# Patient Record
Sex: FEMALE | Race: BLACK OR AFRICAN AMERICAN | Employment: UNEMPLOYED | URBAN - METROPOLITAN AREA
[De-identification: names, ages, dates, MRNs, and addresses within clinical notes are randomized per-mention and may not be internally consistent; named-entity substitution may affect disease eponyms.]

---

## 2017-03-29 ENCOUNTER — TELEPHONE (OUTPATIENT)
Dept: OBGYN | Facility: CLINIC | Age: 27
End: 2017-03-29

## 2017-03-29 ENCOUNTER — TELEPHONE (OUTPATIENT)
Dept: NURSING | Facility: CLINIC | Age: 27
End: 2017-03-29

## 2017-03-29 NOTE — TELEPHONE ENCOUNTER
"Glo is requesting to transfer care to [facility] from her previous clinic [facility] in [place], [place]. Pt states she has been compliant with all prenatal appointments. Per [facility] protocol, all transfer prenatal patients that are equal to or greater than 24 weeks need prior approval from [facility] before scheduling any prenatal appointments.      3 Para 1 (2nd pregnancy was tubal pregnancy)    LMP  EDC 17    U/S done? Yes, last US     Previous C-sec? No    List all major health problems: Prior to pregnancy pt was diagnosed with a \"cyst or pouch\" that was collecting urine on the outside of her \"tube\" causing re-occurring yeast infections and pain. Surgery was scheduled for early Dec, but found out she is pregnant late Nov so surgery delayed until postpartum.     List all complications of past deliveries: ( Ask specifically about Gestational Diabetes, HTN and preeclampsia) Her 8 year old was induced at 6 months of pregnancy d/t pre-eclampsia.     List all current pregnancy issues: (Again, ask specifically about Gestational Diabetes, HTN and preeclampsia) See major health problem section. Also states she has been in and out of hospitals r/t the pain she gets and was told at her last hospitalization that the next time she goes to the ER for pain she may need a nephrostomy tube placed d/t kidney function.    List current medication list Macrobid 100 mg daily (d/t cyst), Aspirin 81 mg daily, Tramadol 50 mg every 4-6 hours prn    Per protocol, message routed to MD on-call for direction.    Pt reports she recently moved from [place] to [place] to assist with caring for her mom. Her last prenatal appt was  with next expected appt  if still in [place]. Pt is aware that if her pain increases she is to go to the ER. Pt does not have her records with her. Pt voices plan to fill out FINESSE.    Pt was walking a lot yest and was having back pain yest, but it is the same pain she has experienced r/t " kidney issues. States she almost went in to ED, but took one of her pain meds and it subsided. Denies vaginal bleeding. Denies pain this AM.

## 2017-03-29 NOTE — TELEPHONE ENCOUNTER
She needs to be scheduled for a nurse visit and at that time bring all of her records for scanning. Then she can be set up with any MD with availability.     Thanks,  Michelle Rojas MD

## 2017-03-29 NOTE — TELEPHONE ENCOUNTER
"Call Type: Triage Call    Presenting Problem: \"I'm five and a half months pregnant.\" Patient  states she has a \"Complicated\" pregnancy and recently moved to  [place], [place] from [place], [place]. MERCEDES: 07/24/17.  Patient denies any  symptoms today. Patient was conferenced to [facility]  Ob/GYN nurse.  Triage Note:  Guideline Title: No Guideline - Advice Per Reference (Adult)  Recommended Disposition: Call Provider Immediately  Original Inclination:  Override Disposition:  Intended Action:  Physician Contacted: No  CALL PROVIDER IMMEDIATELY ?  YES  SEE ED IMMEDIATELY ? NO  ACTIVATE  ? NO  Physician Instructions:  Care Advice:  "

## 2017-03-29 NOTE — TELEPHONE ENCOUNTER
"Unsure if pt will be able to get records in time, but scheduled \"nurse only\" appt for 03/30 as next availability isn't until May. If pt unable to get records to bring to appt she will need to re-schedule.     Called pt, vm left for pt to call clinic back  "

## 2017-04-20 ENCOUNTER — APPOINTMENT (OUTPATIENT)
Dept: ULTRASOUND IMAGING | Facility: CLINIC | Age: 27
End: 2017-04-20
Attending: OBSTETRICS & GYNECOLOGY
Payer: COMMERCIAL

## 2017-04-20 ENCOUNTER — HOSPITAL ENCOUNTER (OUTPATIENT)
Facility: CLINIC | Age: 27
Discharge: HOME | End: 2017-04-20
Attending: OBSTETRICS & GYNECOLOGY | Admitting: OBSTETRICS & GYNECOLOGY
Payer: COMMERCIAL

## 2017-04-20 ENCOUNTER — HOSPITAL ENCOUNTER (OUTPATIENT)
Facility: CLINIC | Age: 27
End: 2017-04-20
Admitting: OBSTETRICS & GYNECOLOGY
Payer: COMMERCIAL

## 2017-04-20 VITALS — SYSTOLIC BLOOD PRESSURE: 116 MMHG | DIASTOLIC BLOOD PRESSURE: 69 MMHG | TEMPERATURE: 97.9 F | RESPIRATION RATE: 20 BRPM

## 2017-04-20 LAB
ALBUMIN UR-MCNC: NEGATIVE MG/DL
AMPHETAMINES UR QL SCN: ABNORMAL
APPEARANCE UR: ABNORMAL
BACTERIA #/AREA URNS HPF: ABNORMAL /HPF
BILIRUB UR QL STRIP: NEGATIVE
CANNABINOIDS UR QL: ABNORMAL
COCAINE UR QL: ABNORMAL
COLOR UR AUTO: YELLOW
FIBRONECTIN FETAL VAG QL: NORMAL
GLUCOSE UR STRIP-MCNC: NEGATIVE MG/DL
HGB UR QL STRIP: ABNORMAL
HYALINE CASTS #/AREA URNS LPF: 2 /LPF (ref 0–2)
KETONES UR STRIP-MCNC: NEGATIVE MG/DL
LEUKOCYTE ESTERASE UR QL STRIP: ABNORMAL
MUCOUS THREADS #/AREA URNS LPF: PRESENT /LPF
NITRATE UR QL: NEGATIVE
OPIATES UR QL SCN: ABNORMAL
PCP UR QL SCN: ABNORMAL
PH UR STRIP: 7 PH (ref 5–7)
RBC #/AREA URNS AUTO: 7 /HPF (ref 0–2)
SP GR UR STRIP: 1.01 (ref 1–1.03)
SQUAMOUS #/AREA URNS AUTO: 11 /HPF (ref 0–1)
URN SPEC COLLECT METH UR: ABNORMAL
UROBILINOGEN UR STRIP-MCNC: 0 MG/DL (ref 0–2)
WBC #/AREA URNS AUTO: 90 /HPF (ref 0–2)

## 2017-04-20 PROCEDURE — 80349 CANNABINOIDS NATURAL: CPT | Performed by: OBSTETRICS & GYNECOLOGY

## 2017-04-20 PROCEDURE — 82731 ASSAY OF FETAL FIBRONECTIN: CPT | Performed by: OBSTETRICS & GYNECOLOGY

## 2017-04-20 PROCEDURE — 99214 OFFICE O/P EST MOD 30 MIN: CPT

## 2017-04-20 PROCEDURE — 76770 US EXAM ABDO BACK WALL COMP: CPT

## 2017-04-20 PROCEDURE — 81001 URINALYSIS AUTO W/SCOPE: CPT | Performed by: OBSTETRICS & GYNECOLOGY

## 2017-04-20 PROCEDURE — 87086 URINE CULTURE/COLONY COUNT: CPT | Performed by: OBSTETRICS & GYNECOLOGY

## 2017-04-20 PROCEDURE — 80307 DRUG TEST PRSMV CHEM ANLYZR: CPT | Performed by: OBSTETRICS & GYNECOLOGY

## 2017-04-20 RX ORDER — PRENATAL VIT/IRON FUM/FOLIC AC 27MG-0.8MG
1 TABLET ORAL DAILY
COMMUNITY
End: 2019-10-07

## 2017-04-20 NOTE — IP AVS SNAPSHOT
[facility] Labor and Delivery    [address]    [facility] [address]    Phone:  [phone]    Fax:  [phone]                                       After Visit Summary   4/20/2017    Glo Meléndez    MRN: [id number]           After Visit Summary Signature Page     I have received my discharge instructions, and my questions have been answered. I have discussed any challenges I see with this plan with the nurse or doctor.    ..........................................................................................................................................  Patient/Patient Representative Signature      ..........................................................................................................................................  Patient Representative Print Name and Relationship to Patient    ..................................................               ................................................  Date                                            Time    ..........................................................................................................................................  Reviewed by Signature/Title    ...................................................              ..............................................  Date                                                            Time

## 2017-04-20 NOTE — IP AVS SNAPSHOT
MRN:[id number]                      After Visit Summary   4/20/2017    Glo Meléndez    MRN: [id number]           Thank you!     Thank you for choosing [facility] for your care. Our goal is always to provide you with excellent care. Hearing back from our patients is one way we can continue to improve our services. Please take a few minutes to complete the written survey that you may receive in the mail after you visit. If you would like to speak to someone directly about your visit please contact Patient Relations at [phone]. Thank you!          Patient Information     Date Of Birth          1990        About your hospital stay     You were admitted on:  April 20, 2017 You last received care in the:  [facility] Labor and Delivery    You were discharged on:  April 20, 2017       Who to Call     For medical emergencies, please call 911.  For non-urgent questions about your medical care, please call your primary care provider or clinic, None          Attending Provider     Provider Specialty    Gilberto Connelly MD OB/Gyn       Primary Care Provider    None       No address on file        Further instructions from your care team       Discharge Instruction for Undelivered Patients      You were seen for: cramping  We Consulted: Dr Connelly  You had (Test or Medicine):monitoring, renal ultrasound, negative Fetal Fibronectin     Diet:   Drink 8 to 12 glasses of liquids (milk, juice, water) every day.  You may eat meals and snacks.     Activity:  Call your doctor or nurse midwife if your baby is moving less than usual.     Call your provider if you notice:  Swelling in your face or increased swelling in your hands or legs.  Headaches that are not relieved by Tylenol (acetaminophen).  Changes in your vision (blurring: seeing spots or stars.)  Nausea (sick to your stomach) and vomiting (throwing up).   Weight gain of 5 pounds or more per week.  Heartburn that doesn't go  "away.  Signs of bladder infection: pain when you urinate (use the toilet), need to go more often and more urgently.  The bag of langley (rupture of membranes) breaks, or you notice leaking in your underwear.  Bright red blood in your underwear.  Abdominal (lower belly) or stomach pain.  For first baby: Contractions (tightening) less than 5 minutes apart for one hour or more.  Second (plus) baby: Contractions (tightening) less than 10 minutes apart and getting stronger.  *If less than 34 weeks: Contractions (tightenings) more than 6 times in one hour.  Increase or change in vaginal discharge (note the color and amount)  Other:     Follow-up:  Get in contact with [facility] clinic in [place] to schedule future appointments.          Pending Results     Date and Time Order Name Status Description    2017 1620 Drug abuse scrn 7 UR (/) (RH, SH, UR) In process     2017 1620 Urine Culture Aerobic Bacterial In process     2017 1614 US Renal Complete Preliminary             Admission Information     Date & Time Provider Department Dept. Phone    2017 Gilberto Connelly MD [facility] Labor and Delivery [phone]      Your Vitals Were     Blood Pressure Temperature Respirations             116/69 97.9  F (36.6  C) (Oral) 20         MyChart Information     [facility] lets you send messages to your doctor, view your test results, renew your prescriptions, schedule appointments and more. To sign up, go to [email or web address] . Click on \"Log in\" on the left side of the screen, which will take you to the Welcome page. Then click on \"Sign up Now\" on the right side of the page.     You will be asked to enter the access code listed below, as well as some personal information. Please follow the directions to create your username and password.     Your access code is: [email or web address]  Expires: 2017  6:17 PM     Your access code will  in 90 days. If you need help or a new " code, please call your [place] clinic or [phone].        Care EveryWhere ID     This is your Care EveryWhere ID. This could be used by other organizations to access your [place] medical records  [id number]           Review of your medicines      UNREVIEWED medicines. Ask your doctor about these medicines        Dose / Directions    ASPIRIN PO        Dose:  81 mg   Take 81 mg by mouth daily   Refills:  0       CELEXA PO        Refills:  0       NITROFURANTOIN MONOHYD MACRO PO        Dose:  100 mg   Take 100 mg by mouth daily   Refills:  0       prenatal multivitamin  plus iron 27-0.8 MG Tabs per tablet   Indication:  Pregnancy        Dose:  1 tablet   Take 1 tablet by mouth daily   Refills:  0       TRAMADOL HCL PO        Dose:  50 mg   Take 50 mg by mouth every 6 hours as needed for moderate to severe pain   Refills:  0                Protect others around you: Learn how to safely use, store and throw away your medicines at www.disposemymeds.org.             Medication List: This is a list of all your medications and when to take them. Check marks below indicate your daily home schedule. Keep this list as a reference.      Medications           Morning Afternoon Evening Bedtime As Needed    ASPIRIN PO   Take 81 mg by mouth daily                                CELEXA PO                                NITROFURANTOIN MONOHYD MACRO PO   Take 100 mg by mouth daily                                prenatal multivitamin  plus iron 27-0.8 MG Tabs per tablet   Take 1 tablet by mouth daily                                TRAMADOL HCL PO   Take 50 mg by mouth every 6 hours as needed for moderate to severe pain
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
## 2017-04-20 NOTE — DISCHARGE INSTRUCTIONS
Discharge Instruction for Undelivered Patients      You were seen for: cramping  We Consulted: Dr Connelly  You had (Test or Medicine):monitoring, renal ultrasound, negative Fetal Fibronectin     Diet:   Drink 8 to 12 glasses of liquids (milk, juice, water) every day.  You may eat meals and snacks.     Activity:  Call your doctor or nurse midwife if your baby is moving less than usual.     Call your provider if you notice:  Swelling in your face or increased swelling in your hands or legs.  Headaches that are not relieved by Tylenol (acetaminophen).  Changes in your vision (blurring: seeing spots or stars.)  Nausea (sick to your stomach) and vomiting (throwing up).   Weight gain of 5 pounds or more per week.  Heartburn that doesn't go away.  Signs of bladder infection: pain when you urinate (use the toilet), need to go more often and more urgently.  The bag of langley (rupture of membranes) breaks, or you notice leaking in your underwear.  Bright red blood in your underwear.  Abdominal (lower belly) or stomach pain.  For first baby: Contractions (tightening) less than 5 minutes apart for one hour or more.  Second (plus) baby: Contractions (tightening) less than 10 minutes apart and getting stronger.  *If less than 34 weeks: Contractions (tightenings) more than 6 times in one hour.  Increase or change in vaginal discharge (note the color and amount)  Other:     Follow-up:  Get in contact with [facility] clinic in [place] to schedule future appointments.

## 2017-04-20 NOTE — PROGRESS NOTES
OB PN    26 yo  at 26w3d presented by ambulance to L&D with pelvic pain.  Denies ctx or pelvic pressure.  No bleeding or discharge.    Patient has been receiving care in [place], [place] but presently residing in [place] and plans to remain here until delivery.  Records obtained and reviewed.    Pregnancy complicated by substance use/abuse, large urethral diverticulum, several visits for pelvic pain and right hydronephrosis.  She has a hx or pre eclampsia and PTD in [facility] at [facility].  Baby weighed 3lbs 7 oz.    She has been on a baby ASA daily and prophylactic macrobid.  Urology has been involved.  She has a prescription for Tramadol for her chronic pain.    She was relatively calm after arriving with no ctx identified.  FFN neg.  Large urethral diverticulum presents which did not allow digital cervix exam.  U/S showed bilat hydronephrosis compatible with pregnancy R>L but no stones or hydro ureter.    Patient will be discharged in stable condition with plan to follow up at [facility] next week.

## 2017-04-21 LAB
BACTERIA SPEC CULT: NORMAL
Lab: NORMAL
MICRO REPORT STATUS: NORMAL
SPECIMEN SOURCE: NORMAL

## 2017-04-24 LAB — THC UR QL CFM: NORMAL

## 2017-05-10 ENCOUNTER — HOSPITAL ENCOUNTER (OUTPATIENT)
Facility: CLINIC | Age: 27
End: 2017-05-10
Admitting: OBSTETRICS & GYNECOLOGY
Payer: COMMERCIAL

## 2017-05-10 ENCOUNTER — HOSPITAL ENCOUNTER (OUTPATIENT)
Facility: CLINIC | Age: 27
Discharge: HOME | End: 2017-05-10
Attending: OBSTETRICS & GYNECOLOGY | Admitting: OBSTETRICS & GYNECOLOGY
Payer: COMMERCIAL

## 2017-05-10 VITALS
DIASTOLIC BLOOD PRESSURE: 52 MMHG | SYSTOLIC BLOOD PRESSURE: 98 MMHG | HEIGHT: 67 IN | RESPIRATION RATE: 18 BRPM | WEIGHT: 178 LBS | TEMPERATURE: 99.4 F | BODY MASS INDEX: 27.94 KG/M2

## 2017-05-10 LAB
ABO + RH BLD: NORMAL
ABO + RH BLD: NORMAL
ALBUMIN UR-MCNC: NEGATIVE MG/DL
AMORPH CRY #/AREA URNS HPF: ABNORMAL /HPF
AMPHETAMINES UR QL SCN: NORMAL
APPEARANCE UR: ABNORMAL
BACTERIA #/AREA URNS HPF: ABNORMAL /HPF
BILIRUB UR QL STRIP: NEGATIVE
BLD GP AB SCN SERPL QL: NORMAL
BLOOD BANK CMNT PATIENT-IMP: NORMAL
CANNABINOIDS UR QL: NORMAL
COCAINE UR QL: NORMAL
COLOR UR AUTO: YELLOW
ERYTHROCYTE [DISTWIDTH] IN BLOOD BY AUTOMATED COUNT: 12.7 % (ref 10–15)
FIBRONECTIN FETAL VAG QL: NORMAL
GLUCOSE UR STRIP-MCNC: >499 MG/DL
HCT VFR BLD AUTO: 31.9 % (ref 35–47)
HGB BLD-MCNC: 11 G/DL (ref 11.7–15.7)
HGB UR QL STRIP: ABNORMAL
KETONES UR STRIP-MCNC: NEGATIVE MG/DL
LEUKOCYTE ESTERASE UR QL STRIP: ABNORMAL
MCH RBC QN AUTO: 33 PG (ref 26.5–33)
MCHC RBC AUTO-ENTMCNC: 34.5 G/DL (ref 31.5–36.5)
MCV RBC AUTO: 96 FL (ref 78–100)
MUCOUS THREADS #/AREA URNS LPF: PRESENT /LPF
NITRATE UR QL: NEGATIVE
OPIATES UR QL SCN: NORMAL
PCP UR QL SCN: NORMAL
PH UR STRIP: 7 PH (ref 5–7)
PLATELET # BLD AUTO: 234 10E9/L (ref 150–450)
RBC # BLD AUTO: 3.33 10E12/L (ref 3.8–5.2)
RBC #/AREA URNS AUTO: 11 /HPF (ref 0–2)
SP GR UR STRIP: 1.02 (ref 1–1.03)
SPECIMEN EXP DATE BLD: NORMAL
SQUAMOUS #/AREA URNS AUTO: 5 /HPF (ref 0–1)
URN SPEC COLLECT METH UR: ABNORMAL
UROBILINOGEN UR STRIP-MCNC: 0 MG/DL (ref 0–2)
WBC # BLD AUTO: 7.3 10E9/L (ref 4–11)
WBC #/AREA URNS AUTO: >182 /HPF (ref 0–2)

## 2017-05-10 PROCEDURE — 96360 HYDRATION IV INFUSION INIT: CPT

## 2017-05-10 PROCEDURE — 81001 URINALYSIS AUTO W/SCOPE: CPT | Performed by: OBSTETRICS & GYNECOLOGY

## 2017-05-10 PROCEDURE — 25800025 ZZH RX 258: Performed by: OBSTETRICS & GYNECOLOGY

## 2017-05-10 PROCEDURE — 85027 COMPLETE CBC AUTOMATED: CPT | Performed by: OBSTETRICS & GYNECOLOGY

## 2017-05-10 PROCEDURE — 80307 DRUG TEST PRSMV CHEM ANLYZR: CPT | Performed by: OBSTETRICS & GYNECOLOGY

## 2017-05-10 PROCEDURE — 87086 URINE CULTURE/COLONY COUNT: CPT | Performed by: OBSTETRICS & GYNECOLOGY

## 2017-05-10 PROCEDURE — 96375 TX/PRO/DX INJ NEW DRUG ADDON: CPT

## 2017-05-10 PROCEDURE — 86900 BLOOD TYPING SEROLOGIC ABO: CPT | Performed by: OBSTETRICS & GYNECOLOGY

## 2017-05-10 PROCEDURE — 96374 THER/PROPH/DIAG INJ IV PUSH: CPT

## 2017-05-10 PROCEDURE — 96372 THER/PROPH/DIAG INJ SC/IM: CPT | Mod: 59

## 2017-05-10 PROCEDURE — 25000125 ZZHC RX 250: Performed by: OBSTETRICS & GYNECOLOGY

## 2017-05-10 PROCEDURE — 86850 RBC ANTIBODY SCREEN: CPT | Performed by: OBSTETRICS & GYNECOLOGY

## 2017-05-10 PROCEDURE — 96361 HYDRATE IV INFUSION ADD-ON: CPT

## 2017-05-10 PROCEDURE — 40000809 ZZH STATISTIC NO DOCUMENTATION TO SUPPORT CHARGE

## 2017-05-10 PROCEDURE — 25000128 H RX IP 250 OP 636: Performed by: OBSTETRICS & GYNECOLOGY

## 2017-05-10 PROCEDURE — 82731 ASSAY OF FETAL FIBRONECTIN: CPT | Performed by: OBSTETRICS & GYNECOLOGY

## 2017-05-10 PROCEDURE — 99214 OFFICE O/P EST MOD 30 MIN: CPT | Mod: 25

## 2017-05-10 PROCEDURE — 96368 THER/DIAG CONCURRENT INF: CPT

## 2017-05-10 PROCEDURE — 86901 BLOOD TYPING SEROLOGIC RH(D): CPT | Performed by: OBSTETRICS & GYNECOLOGY

## 2017-05-10 PROCEDURE — 86780 TREPONEMA PALLIDUM: CPT | Performed by: OBSTETRICS & GYNECOLOGY

## 2017-05-10 RX ORDER — TERBUTALINE SULFATE 1 MG/ML
0.25 INJECTION, SOLUTION SUBCUTANEOUS ONCE
Status: COMPLETED | OUTPATIENT
Start: 2017-05-10 | End: 2017-05-10

## 2017-05-10 RX ORDER — ACETAMINOPHEN 10 MG/ML
1000 INJECTION, SOLUTION INTRAVENOUS ONCE
Status: COMPLETED | OUTPATIENT
Start: 2017-05-10 | End: 2017-05-10

## 2017-05-10 RX ORDER — CEFTRIAXONE 2 G/1
2 INJECTION, POWDER, FOR SOLUTION INTRAMUSCULAR; INTRAVENOUS ONCE
Status: COMPLETED | OUTPATIENT
Start: 2017-05-10 | End: 2017-05-10

## 2017-05-10 RX ORDER — SODIUM CHLORIDE 9 MG/ML
INJECTION, SOLUTION INTRAVENOUS CONTINUOUS
Status: DISCONTINUED | OUTPATIENT
Start: 2017-05-10 | End: 2017-05-10 | Stop reason: HOSPADM

## 2017-05-10 RX ORDER — DEXTROSE, SODIUM CHLORIDE, SODIUM LACTATE, POTASSIUM CHLORIDE, AND CALCIUM CHLORIDE 5; .6; .31; .03; .02 G/100ML; G/100ML; G/100ML; G/100ML; G/100ML
500 INJECTION, SOLUTION INTRAVENOUS ONCE
Status: COMPLETED | OUTPATIENT
Start: 2017-05-10 | End: 2017-05-10

## 2017-05-10 RX ORDER — DEXTROSE, SODIUM CHLORIDE, SODIUM LACTATE, POTASSIUM CHLORIDE, AND CALCIUM CHLORIDE 5; .6; .31; .03; .02 G/100ML; G/100ML; G/100ML; G/100ML; G/100ML
125 INJECTION, SOLUTION INTRAVENOUS CONTINUOUS
Status: DISCONTINUED | OUTPATIENT
Start: 2017-05-10 | End: 2017-05-10

## 2017-05-10 RX ORDER — MORPHINE SULFATE 4 MG/ML
4 INJECTION, SOLUTION INTRAMUSCULAR; INTRAVENOUS ONCE
Status: COMPLETED | OUTPATIENT
Start: 2017-05-10 | End: 2017-05-10

## 2017-05-10 RX ORDER — ONDANSETRON 2 MG/ML
4 INJECTION INTRAMUSCULAR; INTRAVENOUS EVERY 6 HOURS PRN
Status: DISCONTINUED | OUTPATIENT
Start: 2017-05-10 | End: 2017-05-10 | Stop reason: HOSPADM

## 2017-05-10 RX ADMIN — CEFTRIAXONE 2 G: 2 INJECTION, POWDER, FOR SOLUTION INTRAMUSCULAR; INTRAVENOUS at 15:43

## 2017-05-10 RX ADMIN — SODIUM CHLORIDE: 9 INJECTION, SOLUTION INTRAVENOUS at 15:43

## 2017-05-10 RX ADMIN — SODIUM CHLORIDE, SODIUM LACTATE, POTASSIUM CHLORIDE, CALCIUM CHLORIDE AND DEXTROSE MONOHYDRATE 500 ML: 5; 600; 310; 30; 20 INJECTION, SOLUTION INTRAVENOUS at 12:10

## 2017-05-10 RX ADMIN — TERBUTALINE SULFATE 0.25 MG: 1 INJECTION, SOLUTION SUBCUTANEOUS at 11:56

## 2017-05-10 RX ADMIN — ACETAMINOPHEN 1000 MG: 10 INJECTION, SOLUTION INTRAVENOUS at 12:41

## 2017-05-10 RX ADMIN — ONDANSETRON 4 MG: 2 INJECTION INTRAMUSCULAR; INTRAVENOUS at 14:51

## 2017-05-10 RX ADMIN — MORPHINE SULFATE 4 MG: 4 INJECTION, SOLUTION INTRAMUSCULAR; INTRAVENOUS at 14:12

## 2017-05-10 NOTE — DISCHARGE INSTRUCTIONS
Discharge Instruction for Undelivered Patients      You were seen for: Contractions and pain  We Consulted:   You had (Test or Medicine):monitoring, UA, IV tylenol and antibiotics     Diet:   Drink 8 to 12 glasses of liquids (milk, juice, water) every day.     Activity:  Call your doctor or nurse midwife if your baby is moving less than usual.     Call your provider if you notice:  Swelling in your face or increased swelling in your hands or legs.  Headaches that are not relieved by Tylenol (acetaminophen).  Changes in your vision (blurring: seeing spots or stars.)  Nausea (sick to your stomach) and vomiting (throwing up).   Weight gain of 5 pounds or more per week.  Heartburn that doesn't go away.  Signs of bladder infection: pain when you urinate (use the toilet), need to go more often and more urgently.  The bag of langley (rupture of membranes) breaks, or you notice leaking in your underwear.  Bright red blood in your underwear.  Abdominal (lower belly) or stomach pain.  Second (plus) baby: Contractions (tightening) less than 10 minutes apart and getting stronger.  *If less than 34 weeks: Contractions (tightenings) more than 6 times in one hour.  Increase or change in vaginal discharge (note the color and amount)      Follow-up:  As scheduled in the clinic on Monday the 15th.  Urology should be calling to follow up by the end of this week.  [facility] will call regarding urine culture.   Start taking Macrobid 1 tablet twice a day for 10 days. After that take 1 tablet once a day until delivery.

## 2017-05-10 NOTE — PROVIDER NOTIFICATION
05/10/17 1710   Provider Notification   Provider Name/Title    Method of Notification Electronic Page;Phone   Request Evaluate - Remote   Notification Reason Status Update   Pt is feeling much better then when she came in, the pain is gone to slightly mild. Pt ready to go home.  Contractions are occasional, pt not feeling.  Orders received to discharge home with follow up on Monday the 15th as already scheduled. Instruct pt to take home Macrobid twice a day for 10 days then take once a day until delivery. Inform pt Urology should be calling her by the end of the week as follow up and [facility] will call regarding the urine culture results.

## 2017-05-10 NOTE — PLAN OF CARE
Reviewed discharge instructions with patient. Verbalized understanding of Macrobid directions and follow up.  Pt ambulatory at discharge accompanied by her mother.   Discharged home undelivered.

## 2017-05-10 NOTE — PROGRESS NOTES
Pt c/o's of continued pain, screaming in triage room.  Using profanity stating we are not helping her.  IV fluids going, IV Tylenol almost in.  Refused CEFT/TOCO and threw monitors off.  States her abdominal pain is not bothering her but it's the urethral cyst because of our vaginal checks.  States when she goes in to [facility] in [place] for this pain, they give her real pain medication and that does the trick.  She is threatening to leave and call her Mom.    I explained out PTL evaluation, cervix closed FFN negative which is good as she is 29+ weeks.  Discussed the importance of checking her cervix in this setting to determine proper care.  Explained we are actually treating her pain with IV Tylenol which has been shown to be as effective as narcotics in some studies.  I relayed my concern about using narcotics with her drug hx.      We will continue to monitor at this time.    Dr. Mónica Lloyd
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
## 2017-05-10 NOTE — PLAN OF CARE
Pt came in by ambulance with severe abdominal pain which started at 1020. Describes the pain as severe sharp and cramping, especially on the left. Rates it 10 out of 10. Dr Lloyd is at the bedside. Orders received. FFN collected. SVE closed/50/-3 station. Denies any vaginal bleeding or LOF. Pt is having ctx  Every 1-3 minutes. Terbutaline 0.25 mg sub q given.PIV started in left arm and D5  ml bolus was started.

## 2017-05-10 NOTE — PROGRESS NOTES
Late entry from around 1430.  Called to room as pt is screaming in pain and on the floor.  States that she continues to have severe pain in area of urethral cyst, exacerbated by our previous exam.  IV Tylenol helped minimally.  Requesting Morphine for pain.  Given the amount of discomfort pt is in, will give 4 mg of IV Morphine and continue to monitor.  Awaiting UA and UDS.      Addendum around 1530- UA reviewed and concerning for UTI, UDS negative.  Dose of IV Rocephin ordered and will continue to monitor.    Addendum around 1700- talked with Kerri ARNODL.  Pt's pain significantly better since IV Morphine.  FHT's reassuring at 29 weeks with irritability on monitor.  Pt desires discharge.  Recommended she take BID Macrobid (as she has been taking it intermittently on a daily basis) for 10 days and that we would be calling her with UC by the end of the week and will make abx change if needed.  Urology will likely call by end of the week as well as she had recent appt with them.  Pt instructed to keep scheduled OB visit for 5/15 and return precautions reviewed.    A/P IUP 29 2/7 with UTI complicated by urethral diverticulum, no evidence of PTL- see note above.    Dr. Mónica Lloyd

## 2017-05-10 NOTE — PLAN OF CARE
Pt is refusing to have the external monitors on to monitor uterine contractions and FHR. Explained the importance of monitoring baby and the ctx. Pt is using profanity and took off the monitors off, threw them on the floor and states she doesn't care. Ofirmev medication explained to pt who agrees with plan. PIV started in left arm with 18 g cath. Labs collected. Ofirmev infusion is completed. Pt reports the ctx pain is gone, but her cyst pain has not been touched. Dr Lloyd was called to the bedside. After talking with pt she keeps saying she just wants a narcotic for the pain. She then called the hospital in [place] stating she's not getting any medication for the pain or help with the ctx.

## 2017-05-10 NOTE — PLAN OF CARE
Pt appears much more comfortable after the morphine. Rates her pain @ 4 out of 10. Dr Lloyd called with the UA results. Orders received for Rocephin 2 gm IV now.  Report given to Kerri Norton RN who is assuming cares.

## 2017-05-10 NOTE — IP AVS SNAPSHOT
MRN:[id number]                      After Visit Summary   5/10/2017    Glo Meléndez    MRN: [id number]           Thank you!     Thank you for choosing [facility] for your care. Our goal is always to provide you with excellent care. Hearing back from our patients is one way we can continue to improve our services. Please take a few minutes to complete the written survey that you may receive in the mail after you visit. If you would like to speak to someone directly about your visit please contact Patient Relations at [phone]. Thank you!          Patient Information     Date Of Birth          1990        About your hospital stay     You were admitted on:  May 10, 2017 You last received care in the:  [facility]    You were discharged on:  May 10, 2017       Who to Call     For medical emergencies, please call 911.  For non-urgent questions about your medical care, please call your primary care provider or clinic, None          Attending Provider     Provider Specialty    Mónica Lloyd, DO OB/Gyn       Primary Care Provider    None       No address on file        Further instructions from your care team       Discharge Instruction for Undelivered Patients      You were seen for: Contractions and pain  We Consulted:   You had (Test or Medicine):monitoring, UA, IV tylenol and antibiotics     Diet:   Drink 8 to 12 glasses of liquids (milk, juice, water) every day.     Activity:  Call your doctor or nurse midwife if your baby is moving less than usual.     Call your provider if you notice:  Swelling in your face or increased swelling in your hands or legs.  Headaches that are not relieved by Tylenol (acetaminophen).  Changes in your vision (blurring: seeing spots or stars.)  Nausea (sick to your stomach) and vomiting (throwing up).   Weight gain of 5 pounds or more per week.  Heartburn that doesn't go away.  Signs of bladder infection: pain  "when you urinate (use the toilet), need to go more often and more urgently.  The bag of langley (rupture of membranes) breaks, or you notice leaking in your underwear.  Bright red blood in your underwear.  Abdominal (lower belly) or stomach pain.  Second (plus) baby: Contractions (tightening) less than 10 minutes apart and getting stronger.  *If less than 34 weeks: Contractions (tightenings) more than 6 times in one hour.  Increase or change in vaginal discharge (note the color and amount)      Follow-up:  As scheduled in the clinic on Monday the .  Urology should be calling to follow up by the end of this week.  [facility] will call regarding urine culture.   Start taking Macrobid 1 tablet twice a day for 10 days. After that take 1 tablet once a day until delivery.        Pending Results     Date and Time Order Name Status Description    5/10/2017 1200 Anti Treponema In process             Admission Information     Date & Time Provider Department Dept. Phone    5/10/2017 Mónica Lloyd,  [facility] [phone]      Your Vitals Were     Blood Pressure Temperature Respirations Height Weight BMI (Body Mass Index)    98/52 99.4  F (37.4  C) (Temporal) 18 1.702 m (5' 7\") 80.7 kg (178 lb) 27.88 kg/m2      [employer] Information     [employer] lets you send messages to your doctor, view your test results, renew your prescriptions, schedule appointments and more. To sign up, go to [email or web address] . Click on \"Log in\" on the left side of the screen, which will take you to the Welcome page. Then click on \"Sign up Now\" on the right side of the page.     You will be asked to enter the access code listed below, as well as some personal information. Please follow the directions to create your username and password.     Your access code is: [email or web address]  Expires: 2017  6:17 PM     Your access code will  in 90 days. If you need help or a new code, please call your [facility] or " [phone].        Care EveryWhere ID     This is your Care EveryWhere ID. This could be used by other organizations to access your [place] medical records  [id number]           Review of your medicines      UNREVIEWED medicines. Ask your doctor about these medicines        Dose / Directions    ASPIRIN PO        Dose:  81 mg   Take 81 mg by mouth daily   Refills:  0       CELEXA PO        Refills:  0       NITROFURANTOIN MONOHYD MACRO PO        Dose:  100 mg   Take 100 mg by mouth daily   Refills:  0       prenatal multivitamin  plus iron 27-0.8 MG Tabs per tablet   Indication:  Pregnancy        Dose:  1 tablet   Take 1 tablet by mouth daily   Refills:  0       TRAMADOL HCL PO        Dose:  50 mg   Take 50 mg by mouth every 6 hours as needed for moderate to severe pain   Refills:  0                Protect others around you: Learn how to safely use, store and throw away your medicines at www.disposemymeds.org.             Medication List: This is a list of all your medications and when to take them. Check marks below indicate your daily home schedule. Keep this list as a reference.      Medications           Morning Afternoon Evening Bedtime As Needed    ASPIRIN PO   Take 81 mg by mouth daily                                CELEXA PO                                NITROFURANTOIN MONOHYD MACRO PO   Take 100 mg by mouth daily                                prenatal multivitamin  plus iron 27-0.8 MG Tabs per tablet   Take 1 tablet by mouth daily                                TRAMADOL HCL PO   Take 50 mg by mouth every 6 hours as needed for moderate to severe pain

## 2017-05-10 NOTE — PLAN OF CARE
Pt continues screaming and profanities. Dr Lloyd came to the bedside to try and discuss how to manage the pain from the cyst. Pt again threw off the monitors and climbed off the bed and went on her hands and knees on the floor. Encouraged to get back into bed. Orders received to give Morphine 4 mg IV once. Upon leaving the room to get the medication, the screaming stopped.

## 2017-05-10 NOTE — IP AVS SNAPSHOT
[facility]    [address]    [facility] [address]    Phone:  [phone]    Fax:  [phone]                                       After Visit Summary   5/10/2017    Glo Meléndez    MRN: [id number]           After Visit Summary Signature Page     I have received my discharge instructions, and my questions have been answered. I have discussed any challenges I see with this plan with the nurse or doctor.    ..........................................................................................................................................  Patient/Patient Representative Signature      ..........................................................................................................................................  Patient Representative Print Name and Relationship to Patient    ..................................................               ................................................  Date                                            Time    ..........................................................................................................................................  Reviewed by Signature/Title    ...................................................              ..............................................  Date                                                            Time

## 2017-05-11 LAB — T PALLIDUM IGG+IGM SER QL: NEGATIVE

## 2017-05-12 LAB
BACTERIA SPEC CULT: NORMAL
Lab: NORMAL
MICRO REPORT STATUS: NORMAL
SPECIMEN SOURCE: NORMAL

## 2017-05-18 ENCOUNTER — HOSPITAL ENCOUNTER (INPATIENT)
Facility: CLINIC | Age: 27
Setting detail: SURGERY ADMIT
End: 2017-05-18
Attending: OBSTETRICS & GYNECOLOGY | Admitting: OBSTETRICS & GYNECOLOGY
Payer: COMMERCIAL

## 2017-07-10 RX ORDER — CEFAZOLIN SODIUM 2 G/100ML
2 INJECTION, SOLUTION INTRAVENOUS
Status: CANCELLED | OUTPATIENT
Start: 2017-07-10

## 2017-07-10 RX ORDER — CEFAZOLIN SODIUM 1 G/3ML
1 INJECTION, POWDER, FOR SOLUTION INTRAMUSCULAR; INTRAVENOUS
Status: CANCELLED | OUTPATIENT
Start: 2017-07-10

## 2017-07-10 RX ORDER — CITRIC ACID/SODIUM CITRATE 334-500MG
30 SOLUTION, ORAL ORAL
Status: CANCELLED | OUTPATIENT
Start: 2017-07-10

## 2017-07-10 RX ORDER — SODIUM CHLORIDE, SODIUM LACTATE, POTASSIUM CHLORIDE, CALCIUM CHLORIDE 600; 310; 30; 20 MG/100ML; MG/100ML; MG/100ML; MG/100ML
INJECTION, SOLUTION INTRAVENOUS CONTINUOUS
Status: CANCELLED | OUTPATIENT
Start: 2017-07-10

## 2019-10-07 ENCOUNTER — HOSPITAL ENCOUNTER (EMERGENCY)
Facility: CLINIC | Age: 29
Discharge: HOME | End: 2019-10-07
Attending: EMERGENCY MEDICINE | Admitting: EMERGENCY MEDICINE
Payer: MEDICAID

## 2019-10-07 ENCOUNTER — APPOINTMENT (OUTPATIENT)
Dept: ULTRASOUND IMAGING | Facility: CLINIC | Age: 29
End: 2019-10-07
Attending: EMERGENCY MEDICINE
Payer: MEDICAID

## 2019-10-07 VITALS
DIASTOLIC BLOOD PRESSURE: 81 MMHG | TEMPERATURE: 98.4 F | OXYGEN SATURATION: 99 % | HEART RATE: 74 BPM | RESPIRATION RATE: 16 BRPM | SYSTOLIC BLOOD PRESSURE: 110 MMHG

## 2019-10-07 DIAGNOSIS — N76.0 BV (BACTERIAL VAGINOSIS): ICD-10-CM

## 2019-10-07 DIAGNOSIS — B96.89 BV (BACTERIAL VAGINOSIS): ICD-10-CM

## 2019-10-07 DIAGNOSIS — N93.9 VAGINAL BLEEDING: ICD-10-CM

## 2019-10-07 DIAGNOSIS — O41.8X11 SUBCHORIONIC HEMATOMA IN FIRST TRIMESTER, FETUS 1 OF MULTIPLE GESTATION: ICD-10-CM

## 2019-10-07 DIAGNOSIS — O46.8X1 SUBCHORIONIC HEMATOMA IN FIRST TRIMESTER, FETUS 1 OF MULTIPLE GESTATION: ICD-10-CM

## 2019-10-07 DIAGNOSIS — Z33.1 PREGNANCY, INCIDENTAL: ICD-10-CM

## 2019-10-07 LAB
ALBUMIN UR-MCNC: NEGATIVE MG/DL
ANION GAP SERPL CALCULATED.3IONS-SCNC: 8 MMOL/L (ref 3–14)
APPEARANCE UR: CLEAR
B-HCG SERPL-ACNC: ABNORMAL IU/L (ref 0–5)
BASOPHILS # BLD AUTO: 0 10E9/L (ref 0–0.2)
BASOPHILS NFR BLD AUTO: 0.4 %
BILIRUB UR QL STRIP: NEGATIVE
BUN SERPL-MCNC: 6 MG/DL (ref 7–30)
CALCIUM SERPL-MCNC: 8.6 MG/DL (ref 8.5–10.1)
CHLORIDE SERPL-SCNC: 106 MMOL/L (ref 94–109)
CO2 SERPL-SCNC: 23 MMOL/L (ref 20–32)
COLOR UR AUTO: ABNORMAL
CREAT SERPL-MCNC: 0.66 MG/DL (ref 0.52–1.04)
DIFFERENTIAL METHOD BLD: NORMAL
EOSINOPHIL # BLD AUTO: 0.1 10E9/L (ref 0–0.7)
EOSINOPHIL NFR BLD AUTO: 2.5 %
ERYTHROCYTE [DISTWIDTH] IN BLOOD BY AUTOMATED COUNT: 12.3 % (ref 10–15)
GFR SERPL CREATININE-BSD FRML MDRD: >90 ML/MIN/{1.73_M2}
GLUCOSE SERPL-MCNC: 77 MG/DL (ref 70–99)
GLUCOSE UR STRIP-MCNC: 70 MG/DL
HCT VFR BLD AUTO: 36.4 % (ref 35–47)
HGB BLD-MCNC: 12.2 G/DL (ref 11.7–15.7)
HGB UR QL STRIP: NEGATIVE
IMM GRANULOCYTES # BLD: 0 10E9/L (ref 0–0.4)
IMM GRANULOCYTES NFR BLD: 0.2 %
KETONES UR STRIP-MCNC: NEGATIVE MG/DL
LEUKOCYTE ESTERASE UR QL STRIP: NEGATIVE
LYMPHOCYTES # BLD AUTO: 1.7 10E9/L (ref 0.8–5.3)
LYMPHOCYTES NFR BLD AUTO: 29.4 %
MCH RBC QN AUTO: 31.9 PG (ref 26.5–33)
MCHC RBC AUTO-ENTMCNC: 33.5 G/DL (ref 31.5–36.5)
MCV RBC AUTO: 95 FL (ref 78–100)
MONOCYTES # BLD AUTO: 0.7 10E9/L (ref 0–1.3)
MONOCYTES NFR BLD AUTO: 12.7 %
MUCOUS THREADS #/AREA URNS LPF: PRESENT /LPF
NEUTROPHILS # BLD AUTO: 3.1 10E9/L (ref 1.6–8.3)
NEUTROPHILS NFR BLD AUTO: 54.8 %
NITRATE UR QL: NEGATIVE
NRBC # BLD AUTO: 0 10*3/UL
NRBC BLD AUTO-RTO: 0 /100
PH UR STRIP: 6 PH (ref 5–7)
PLATELET # BLD AUTO: 253 10E9/L (ref 150–450)
POTASSIUM SERPL-SCNC: 3.8 MMOL/L (ref 3.4–5.3)
RBC # BLD AUTO: 3.83 10E12/L (ref 3.8–5.2)
RBC #/AREA URNS AUTO: 1 /HPF (ref 0–2)
SODIUM SERPL-SCNC: 137 MMOL/L (ref 133–144)
SOURCE: ABNORMAL
SP GR UR STRIP: 1.02 (ref 1–1.03)
SPECIMEN SOURCE: ABNORMAL
SQUAMOUS #/AREA URNS AUTO: 2 /HPF (ref 0–1)
UROBILINOGEN UR STRIP-MCNC: NORMAL MG/DL (ref 0–2)
WBC # BLD AUTO: 5.7 10E9/L (ref 4–11)
WBC #/AREA URNS AUTO: 1 /HPF (ref 0–5)
WET PREP SPEC: ABNORMAL

## 2019-10-07 PROCEDURE — 80048 BASIC METABOLIC PNL TOTAL CA: CPT | Performed by: EMERGENCY MEDICINE

## 2019-10-07 PROCEDURE — 76817 TRANSVAGINAL US OBSTETRIC: CPT

## 2019-10-07 PROCEDURE — 76801 OB US < 14 WKS SINGLE FETUS: CPT

## 2019-10-07 PROCEDURE — 96360 HYDRATION IV INFUSION INIT: CPT

## 2019-10-07 PROCEDURE — 84702 CHORIONIC GONADOTROPIN TEST: CPT | Performed by: EMERGENCY MEDICINE

## 2019-10-07 PROCEDURE — 99285 EMERGENCY DEPT VISIT HI MDM: CPT | Mod: 25

## 2019-10-07 PROCEDURE — 93005 ELECTROCARDIOGRAM TRACING: CPT

## 2019-10-07 PROCEDURE — 87210 SMEAR WET MOUNT SALINE/INK: CPT | Performed by: EMERGENCY MEDICINE

## 2019-10-07 PROCEDURE — 81001 URINALYSIS AUTO W/SCOPE: CPT | Performed by: EMERGENCY MEDICINE

## 2019-10-07 PROCEDURE — 87086 URINE CULTURE/COLONY COUNT: CPT | Performed by: EMERGENCY MEDICINE

## 2019-10-07 PROCEDURE — 87491 CHLMYD TRACH DNA AMP PROBE: CPT | Performed by: EMERGENCY MEDICINE

## 2019-10-07 PROCEDURE — 25000128 H RX IP 250 OP 636: Performed by: EMERGENCY MEDICINE

## 2019-10-07 PROCEDURE — 87591 N.GONORRHOEAE DNA AMP PROB: CPT | Performed by: EMERGENCY MEDICINE

## 2019-10-07 PROCEDURE — 85025 COMPLETE CBC W/AUTO DIFF WBC: CPT | Performed by: EMERGENCY MEDICINE

## 2019-10-07 RX ORDER — METRONIDAZOLE 7.5 MG/G
GEL VAGINAL
Qty: 25 G | Refills: 0 | Status: SHIPPED | OUTPATIENT
Start: 2019-10-07 | End: 2019-10-12

## 2019-10-07 RX ORDER — SODIUM CHLORIDE 9 MG/ML
1000 INJECTION, SOLUTION INTRAVENOUS CONTINUOUS
Status: DISCONTINUED | OUTPATIENT
Start: 2019-10-07 | End: 2019-10-08 | Stop reason: HOSPADM

## 2019-10-07 RX ADMIN — SODIUM CHLORIDE 1000 ML: 9 INJECTION, SOLUTION INTRAVENOUS at 21:46

## 2019-10-07 ASSESSMENT — ENCOUNTER SYMPTOMS
FREQUENCY: 0
LIGHT-HEADEDNESS: 1
BACK PAIN: 1
ABDOMINAL PAIN: 1
SHORTNESS OF BREATH: 0
WEAKNESS: 0
NUMBNESS: 0
DIAPHORESIS: 1

## 2019-10-07 NOTE — ED AVS SNAPSHOT
[facility] Emergency Department  [address]  [facility] [address]  Phone:  [phone]  Fax:  [phone]                                    Glo Meléndez   MRN: [id number]    Department:  [facility] Emergency Department   Date of Visit:  10/7/2019           After Visit Summary Signature Page    I have received my discharge instructions, and my questions have been answered. I have discussed any challenges I see with this plan with the nurse or doctor.    ..........................................................................................................................................  Patient/Patient Representative Signature      ..........................................................................................................................................  Patient Representative Print Name and Relationship to Patient    ..................................................               ................................................  Date                                   Time    ..........................................................................................................................................  Reviewed by Signature/Title    ...................................................              ..............................................  Date                                               Time          22EPIC Rev 08/18

## 2019-10-08 LAB
C TRACH DNA SPEC QL NAA+PROBE: NEGATIVE
INTERPRETATION ECG - MUSE: NORMAL
N GONORRHOEA DNA SPEC QL NAA+PROBE: NEGATIVE
SPECIMEN SOURCE: NORMAL
SPECIMEN SOURCE: NORMAL

## 2019-10-08 NOTE — ED PROVIDER NOTES
"  History     Chief Complaint:  Vaginal Bleeding      HPI   Glo Meléndez is a 29 year old female, currently approximately 9 weeks 6 days pregnant (), who presents with left-sided abdominal cramping and vaginal bleeding which started three days ago. She also notes a \"fishy\" smell near her vagina which started two days ago as well as back pain, hot flashes and lightheadedness. She has a history of herpes which is not active currently. She has not been tested for STIs recently. The father of her child also has herpes.  LMP 19. She states she considered  at the beginning of her pregnancy, but then decided to keep the child. Her attitude towards the pregnancy is more \"what will be, will be.\" Patient denies urinary frequency, chest pain, shortness of breath, weakness, numbness and tingling.    US OB transvaginal 19  IMPRESSION:  Nur IUP  6 weeks and 6 days by 1st Tri Sono. (MERCEDES=MAY 7 2020)  6 weeks and 6 days by this ultrasound. (MERCEDES=MAY 7 2020)  Regular fetal heart rate of 123 bpm    Cardiac Risk Factors   Sex: Female   Tobacco: Negative   Hypertension: Positive  Diabetes: Negative  Hyperlipidemia: Negative  Family History: Negative    Allergies:  Bactrim  Sulfa     Medications:    Aspirin   Celexa  Nitrofurantoin  Tramadol      Past Medical History:    CKD  HTN  Mental disorder    Past Surgical History:    Removal of right fallopian tube after tubal pregnancy     Family History:    History reviewed. No pertinent family history.    Social History:  Presents to the ED by herself.   Tobacco Use: Current smoker  Alcohol Use: Has not used since 10  Marital Status:  Single [1]     Review of Systems   Constitutional: Positive for diaphoresis.   Respiratory: Negative for shortness of breath.    Cardiovascular: Negative for chest pain.   Gastrointestinal: Positive for abdominal pain.   Genitourinary: Positive for vaginal bleeding. Negative for frequency.        Positive for abnormal vaginal " smell.    Musculoskeletal: Positive for back pain.   Neurological: Positive for light-headedness. Negative for weakness and numbness.   All other systems reviewed and are negative.    Physical Exam   First Vitals:  BP: (!) 116/91  Pulse: 103  Heart Rate: 103  Temp: 98.4  F (36.9  C)  Resp: 18  SpO2: 99 %  Lying Orthostatic BP: 106/69  Lying Orthostatic Pulse: 82 bpm  Sitting Orthostatic BP: 111/76  Sitting Orthostatic Pulse: 86 bpm  Standing Orthostatic BP: 110/81  Standing Orthostatic Pulse: 100 bpm      Physical Exam  General: Resting on the bed.  Head: No obvious trauma to head.  Ears, Nose, Throat:  External ears normal.  Nose normal.    Eyes:  Conjunctivae clear.   CV: Regular rate and rhythm.  No murmurs.      Respiratory: Effort normal and breath sounds normal.  No wheezing or crackles.   Gastrointestinal: Soft.  No distension. There is mild right suprapubic tenderness.   Skin: Skin is warm and dry.  No rash noted.   Pelvic: Normal external genitalia.  No appreciable lesions.  Green-yellow discharge noted from the vaginal vault.  No cervical motion tenderness or adnexal tenderness.  No cervical friability.    Emergency Department Course   ECG:  @ 2133  Indication: Lightheadedness  Vent. Rate 84 bpm. MD interval 156 ms. QRS duration 74 ms. QT/QTc 334/394 ms. P-R-T axis 56 83 37.   Normal sinus rhythm. Normal ECG.    Read @ 2143 by Dr. Harris.    Imaging:  Radiographic findings were communicated with the patient who voiced understanding of the findings.    US OB < 14 Weeks Single   Preliminary Result   IMPRESSION:    1.  Single live intrauterine pregnancy at 9 weeks 6 days estimated gestational age based upon crown-rump length measurement on this study. The estimated date of delivery is 5/5/2020.   2.  Tiny subchorionic hemorrhage.   3.  Unremarkable appearance of the ovaries.   4.  No free fluid in the pelvis.         Laboratory:  CBC:  WBC 5.7, HGB 12.2, , otherwise WNL  BMP: BUN 6 (L), otherwise WNL  (Creatinine 0.66)  UA: Clear light yellow urine, glucose 70. Mucous present, otherwise WNL  HC,037  Wet Prep: Few PMN's seen.  No Trichomonas seen.  Clue cells seen. No yeast seen.  Neisseria gonorrhoeae PCR: Pending.   Chlamydia trachomatis PCR: Pending.    Interventions:  : Normal Saline, 1 liter, IV bolus     Emergency Department Course:  The patient arrived in triage where vitals were measured and recorded.   The patient was then escorted back to the emergency department.   The patient's medical records were reviewed.  Nursing notes and vitals were reviewed.  : I performed an exam of the patient as documented above.  The above workup was undertaken.  : I rechecked the patient and discussed results.    Findings and plan explained to the Patient. Patient discharged home, status improved, with instructions regarding supportive care, medications, and reasons to return as well as the importance of close follow-up was reviewed. Patient was prescribed metronidazole gel.        Impression & Plan      Medical Decision Makin-year-old female approximately 9 weeks pregnant presents with vaginal bleeding.  Vital signs are reassuring.  Broad differential was pursued including but not limited to anemia, electric metabolic or renal dysfunction, subchorionic hematoma, vaginitis, PID, cervicitis, TOA, UTI or pyelonephritis, torsion, ectopic, etc.  Patient is A positive.  She does not require RhoGam with her spotting.  She has no active bleeding at this time.  No suggestion of hemorrhage or active bleeding.  EKG was obtained given her lightheadedness with standing.  It showed sinus rhythm no acute ST-T wave changes.  No evidence of arrhythmia.  Orthostatics mildly positive with heart rate increasing with standing but otherwise blood pressure remained stable.  Patient reports that she does feel dehydrated.  She was given fluids.  Do not suspect cardiac etiology, arrhythmia, PE etc.  She is very vague in her  description of this.  No anemia or suggestion of active bleeding.  BMP is reassuring as well.   CBC shows no leukocytosis or anemia.  Pregnancy hormone is elevated at 46,000 consistent with her pregnancy state.  BMP shows no acute electrolyte, metabolic or renal dysfunction.  Wet prep shows clue cells, consistent bacterial vaginosis.  She is symptomatic.  She will be treated with vaginal therapy per her preference.  UA shows no evidence of acute UTI or pyelonephritis.  Culture was added on to rule out asymptomatic bacteriuria secondary to patient being pregnant.  Ultrasound does show single live IUP 9 weeks 6 days.  Tiny subchorionic hemorrhage.  Patient was made aware of these findings.  There is no evidence of torsion or cyst.  Clinically do not suggest PID or TOA or cervicitis.  Gonorrhea and Chlamydia are pending, treatment pending results.  No indication for empiric treatment at this time.  Patient will be encouraged to follow-up with OB/GYN at home.  Strict return precautions discussed.  Advised pelvic rest.  Abstinence for sex for the next 7 days while undergoing treatment for BV.  Close follow-up advised.    Diagnosis:    ICD-10-CM    1. Vaginal bleeding N93.9    2. Pregnancy, incidental Z33.1    3. Subchorionic hematoma in first trimester, fetus 1 of multiple gestation O41.8X11     O46.8X1    4. BV (bacterial vaginosis) N76.0     B96.89        Disposition:  Discharged to home.     Discharge Medications:  New Prescriptions    METRONIDAZOLE (METROGEL-VAGINAL) 0.75 % VAGINAL GEL    5 g daily for five days       Renata NGUYEN, am serving as a scribe on 10/7/2019 at 9:10 PM to personally document services performed by Dr. Harris based on my observations and the provider's statements to me.   [facility] EMERGENCY DEPARTMENT       Livia Harris MD  10/07/19 1629

## 2019-10-08 NOTE — ED TRIAGE NOTES
Left sided bdominal cramp and vaginal bleeding started on Friday, here for concern for child. Now only having vaginal spotting. Went to [facility] x1-2 weeks ago and as told that she was 6 weeks&6 days pregnant. ABCs intact.

## 2019-10-08 NOTE — DISCHARGE INSTRUCTIONS
Please see your PCP or OB in the next 48 hours for a repeat beta HCG test.  Return to the ED if notice increasing bleeding, bleeding through >1 pad per hour for more than 1 hour, lightheaded or dizzy, abdominal pain or other acute changes.    Discharge Instructions  Vaginal Bleeding in Pregnancy    Bleeding in early pregnancy can be a sign of a miscarriage or an abnormal pregnancy, but often is innocent and the pregnancy will continue normally. We may do blood pregnancy tests and ultrasound to try to determine what is causing the bleeding, but sometimes we are unable to tell. If this is the case, it will often require more time, more blood tests, and another ultrasound.     Generally, every Emergency Department visit should have a follow-up clinic visit with either a primary or a specialty clinic/provider. Please follow-up as instructed by your emergency provider today.    Return to the Emergency Department if:  You have severe abdominal (belly) or pelvic pain.  You faint, or feel lightheaded or dizzy.   Your bleeding gets much worse.  You pass any tissue--solid material that does not look like a blood clot. If you pass tissue, save it (even if you have to pull it out of the toilet) and put it in a plastic bag or jar and bring it in.    You have a fever of 100.5 F or higher.  If no pregnancy could be seen:  It may be that everything is normal and it is just too early to see the pregnancy. It is also possible that you could have an ectopic pregnancy, which is a pregnancy in an abnormal location, such as in the tube ( tubal pregnancy ). We don t see evidence that this is likely today but we cannot exclude it with certainty until a pregnancy can be seen in the uterus (womb) and an ectopic pregnancy can cause severe internal bleeding or death so follow-up is very important.  You should not be alone, in case you suddenly become very sick.   You should not have sex or put anything in your vagina.     If a pregnancy was  seen in your uterus:  If a heartbeat could be seen, the chance of miscarriage is lower but because you had bleeding the chance of a miscarriage still exists.  You should not have sex or put anything in your vagina.  Follow-up as directed with your OB/GYN provider.     Facts about miscarriage: We hope you do not have a miscarriage, but if you do, here are important things to know:  Early miscarriage is very common, and having one miscarriage does not mean you will have problems with another pregnancy.  Nothing you did caused it. Taking medicine, drinking alcohol, having sex, exercising, or falling down will not cause a miscarriage.     If you were given a prescription for medicine here today, be sure to read all of the information (including the package insert) that comes with your prescription.  This will include important information about the medicine, its side effects, and any warnings that you need to know about.  The pharmacist who fills the prescription can provide more information and answer questions you may have about the medicine.  If you have questions or concerns that the pharmacist cannot address, please call or return to the Emergency Department.   Remember that you can always come back to the Emergency Department if you are not able to see your regular provider in the amount of time listed above, if you get any new symptoms, or if there is anything that worries you.

## 2019-10-09 LAB
BACTERIA SPEC CULT: NORMAL
Lab: NORMAL
SPECIMEN SOURCE: NORMAL

## 2019-10-09 NOTE — RESULT ENCOUNTER NOTE
Final urine culture report is NEGATIVE per [place] ED Lab Result protocol.    If NEGATIVE result, no change in treatment, per [place] ED Lab Result protocol.

## 2020-01-28 ENCOUNTER — PRE VISIT (OUTPATIENT)
Dept: UROLOGY | Facility: CLINIC | Age: 30
End: 2020-01-28

## 2020-01-28 PROBLEM — F19.10 SUBSTANCE ABUSE (H): Status: ACTIVE | Noted: 2018-02-28

## 2020-01-28 PROBLEM — F32.A DEPRESSION: Status: ACTIVE | Noted: 2020-01-28

## 2020-01-28 PROBLEM — Z72.0 TOBACCO ABUSE: Status: ACTIVE | Noted: 2020-01-28

## 2020-01-28 PROBLEM — N36.1 URETHRAL DIVERTICULUM: Status: ACTIVE | Noted: 2020-01-28

## 2020-01-28 PROBLEM — A49.9 ESBL (EXTENDED SPECTRUM BETA-LACTAMASE) PRODUCING BACTERIA INFECTION: Status: ACTIVE | Noted: 2018-08-06

## 2020-01-28 PROBLEM — Z16.12 ESBL (EXTENDED SPECTRUM BETA-LACTAMASE) PRODUCING BACTERIA INFECTION: Status: ACTIVE | Noted: 2018-08-06

## 2020-01-28 PROBLEM — F41.9 ANXIETY: Status: ACTIVE | Noted: 2020-01-28

## 2020-01-28 PROBLEM — O09.90 SUPERVISION OF HIGH-RISK PREGNANCY: Status: ACTIVE | Noted: 2019-12-27

## 2020-01-28 PROBLEM — Z87.59 H/O SEVERE PRE-ECLAMPSIA: Status: ACTIVE | Noted: 2019-12-27

## 2020-01-28 PROBLEM — F12.10 CANNABIS ABUSE: Status: ACTIVE | Noted: 2020-01-28

## 2020-01-28 PROBLEM — Z98.891 H/O CESAREAN SECTION: Status: ACTIVE | Noted: 2019-12-27

## 2020-01-28 PROBLEM — F14.10 COCAINE SUBSTANCE ABUSE (H): Status: ACTIVE | Noted: 2020-01-28

## 2020-01-28 NOTE — TELEPHONE ENCOUNTER
MEDICAL RECORDS REQUEST   [facility]  Urology Clinic  [address] [place] [id number]  PHONE: [phone]  Fax: [phone]        FUTURE VISIT INFORMATION                                                   GEOVANNA Orona: 1990 scheduled for future visit at [facility]    APPOINTMENT INFORMATION:    Date: 20 9AM    Provider: Joni Day MD    Reason for Visit/Diagnosis: Incontinence     REFERRAL INFORMATION:    Referring provider:  Glo Vanessa    Specialty: CMA    Referring providers clinic:  [facility] contact number:  [phone]    RECORDS REQUESTED FOR VISIT                                                     NOTES  STATUS/DETAILS   OFFICE NOTE from referring provider  yes   OFFICE NOTE from other specialist  yes   DISCHARGE SUMMARY from hospital  yes   DISCHARGE REPORT from the ER  yes   OPERATIVE REPORT  yes   MEDICATION LIST  yes   LABS     URINALYSIS (UA)  yes     PRE-VISIT CHECKLIST      Record collection complete Yes- [facility] recs In CE    Appointment appropriately scheduled           (right time/right provider) Yes   MyChart activation If no, please explain: In process    Questionnaire complete If no, please explain: In process      Completed by: Bety Haddad

## 2020-01-28 NOTE — TELEPHONE ENCOUNTER
Reason for Visit: Consult for incontinence, patient in 2nd trimester of pregnancy    Orders/Procedures/Records: in system    Contact Patient: n/a    Rooming Requirements: UA dip (no PVR patient pregnant, will not be accurate)      Lacie Rios LPN  01/28/20  7:26 AM

## 2020-01-29 ENCOUNTER — MEDICAL CORRESPONDENCE (OUTPATIENT)
Dept: HEALTH INFORMATION MANAGEMENT | Facility: CLINIC | Age: 30
End: 2020-01-29

## 2020-01-29 ENCOUNTER — PRE VISIT (OUTPATIENT)
Dept: UROLOGY | Facility: CLINIC | Age: 30
End: 2020-01-29

## 2021-08-13 ENCOUNTER — TRANSFERRED RECORDS (OUTPATIENT)
Dept: HEALTH INFORMATION MANAGEMENT | Facility: CLINIC | Age: 31
End: 2021-08-13